# Patient Record
Sex: FEMALE | Race: WHITE | URBAN - METROPOLITAN AREA
[De-identification: names, ages, dates, MRNs, and addresses within clinical notes are randomized per-mention and may not be internally consistent; named-entity substitution may affect disease eponyms.]

---

## 2018-01-02 LAB
HEPATITIS B SURFACE ANTIGEN INTERPRETATION: NEGATIVE
HIV-1 AND HIV-2 ANTIBODIES: NEGATIVE
RPR TITER: NONREACTIVE
RUBELLA ANTIBODY IGG: NORMAL

## 2018-07-06 ENCOUNTER — HOSPITAL ENCOUNTER (OUTPATIENT)
Dept: OTHER | Age: 18
Discharge: HOME | End: 2018-07-07
Attending: OBSTETRICS & GYNECOLOGY | Admitting: OBSTETRICS & GYNECOLOGY

## 2018-08-06 ENCOUNTER — HOSPITAL ENCOUNTER (INPATIENT)
Age: 18
LOS: 3 days | Discharge: HOME | End: 2018-08-09
Attending: OBSTETRICS & GYNECOLOGY | Admitting: OBSTETRICS & GYNECOLOGY
Payer: COMMERCIAL

## 2018-08-06 ENCOUNTER — ANESTHESIA (OUTPATIENT)
Dept: LABOR AND DELIVERY | Age: 18
End: 2018-08-06
Payer: COMMERCIAL

## 2018-08-06 ENCOUNTER — ANESTHESIA EVENT (OUTPATIENT)
Dept: LABOR AND DELIVERY | Age: 18
End: 2018-08-06
Payer: COMMERCIAL

## 2018-08-06 PROBLEM — Z37.9 NORMAL LABOR: Status: ACTIVE | Noted: 2018-08-06

## 2018-08-06 PROBLEM — Z67.91 RH NEGATIVE STATUS DURING PREGNANCY, THIRD TRIMESTER: Status: ACTIVE | Noted: 2018-08-06

## 2018-08-06 PROBLEM — O47.9 IRREGULAR UTERINE CONTRACTIONS: Status: ACTIVE | Noted: 2018-08-06

## 2018-08-06 PROBLEM — O26.893 RH NEGATIVE STATUS DURING PREGNANCY, THIRD TRIMESTER: Status: ACTIVE | Noted: 2018-08-06

## 2018-08-06 LAB
ABO/RH: NORMAL
AMPHETAMINE SCREEN, URINE: NORMAL
ANTIBODY SCREEN: NORMAL
BARBITURATE SCREEN URINE: NORMAL
BENZODIAZEPINE SCREEN, URINE: NORMAL
CANNABINOID SCREEN URINE: NORMAL
COCAINE METABOLITE SCREEN URINE: NORMAL
HCT VFR BLD CALC: 40.8 % (ref 36–48)
HEMOGLOBIN: 13.8 G/DL (ref 12–16)
Lab: NORMAL
MCH RBC QN AUTO: 28.5 PG (ref 26–34)
MCHC RBC AUTO-ENTMCNC: 33.8 G/DL (ref 31–36)
MCV RBC AUTO: 84.3 FL (ref 80–100)
METHADONE SCREEN, URINE: NORMAL
OPIATE SCREEN URINE: NORMAL
OXYCODONE URINE: NORMAL
PDW BLD-RTO: 16.2 % (ref 12.4–15.4)
PH UA: 7
PHENCYCLIDINE SCREEN URINE: NORMAL
PLATELET # BLD: 255 K/UL (ref 135–450)
PMV BLD AUTO: 8.4 FL (ref 5–10.5)
PROPOXYPHENE SCREEN: NORMAL
RBC # BLD: 4.83 M/UL (ref 4–5.2)
WBC # BLD: 13.3 K/UL (ref 4–11)

## 2018-08-06 PROCEDURE — 51701 INSERT BLADDER CATHETER: CPT

## 2018-08-06 PROCEDURE — 2500000003 HC RX 250 WO HCPCS: Performed by: ANESTHESIOLOGY

## 2018-08-06 PROCEDURE — 80307 DRUG TEST PRSMV CHEM ANLYZR: CPT

## 2018-08-06 PROCEDURE — 86780 TREPONEMA PALLIDUM: CPT

## 2018-08-06 PROCEDURE — 85027 COMPLETE CBC AUTOMATED: CPT

## 2018-08-06 PROCEDURE — 2580000003 HC RX 258: Performed by: OBSTETRICS & GYNECOLOGY

## 2018-08-06 PROCEDURE — 86850 RBC ANTIBODY SCREEN: CPT

## 2018-08-06 PROCEDURE — 2580000003 HC RX 258: Performed by: NURSE ANESTHETIST, CERTIFIED REGISTERED

## 2018-08-06 PROCEDURE — 1220000000 HC SEMI PRIVATE OB R&B

## 2018-08-06 PROCEDURE — 3700000025 ANESTHESIA EPIDURAL BLOCK: Performed by: ANESTHESIOLOGY

## 2018-08-06 PROCEDURE — 2500000003 HC RX 250 WO HCPCS: Performed by: NURSE ANESTHETIST, CERTIFIED REGISTERED

## 2018-08-06 PROCEDURE — 86900 BLOOD TYPING SEROLOGIC ABO: CPT

## 2018-08-06 PROCEDURE — 36415 COLL VENOUS BLD VENIPUNCTURE: CPT

## 2018-08-06 PROCEDURE — 86901 BLOOD TYPING SEROLOGIC RH(D): CPT

## 2018-08-06 RX ORDER — BUTORPHANOL TARTRATE 1 MG/ML
1 INJECTION, SOLUTION INTRAMUSCULAR; INTRAVENOUS
Status: DISCONTINUED | OUTPATIENT
Start: 2018-08-06 | End: 2018-08-07

## 2018-08-06 RX ORDER — 0.9 % SODIUM CHLORIDE 0.9 %
VIAL (ML) INJECTION PRN
Status: DISCONTINUED | OUTPATIENT
Start: 2018-08-06 | End: 2018-08-07 | Stop reason: SDUPTHER

## 2018-08-06 RX ORDER — ONDANSETRON 2 MG/ML
4 INJECTION INTRAMUSCULAR; INTRAVENOUS EVERY 6 HOURS PRN
Status: DISCONTINUED | OUTPATIENT
Start: 2018-08-06 | End: 2018-08-07

## 2018-08-06 RX ORDER — SODIUM CHLORIDE, SODIUM LACTATE, POTASSIUM CHLORIDE, AND CALCIUM CHLORIDE .6; .31; .03; .02 G/100ML; G/100ML; G/100ML; G/100ML
500 INJECTION, SOLUTION INTRAVENOUS ONCE
Status: DISCONTINUED | OUTPATIENT
Start: 2018-08-06 | End: 2018-08-07

## 2018-08-06 RX ORDER — DOCUSATE SODIUM 100 MG/1
100 CAPSULE, LIQUID FILLED ORAL 2 TIMES DAILY
Status: DISCONTINUED | OUTPATIENT
Start: 2018-08-06 | End: 2018-08-07

## 2018-08-06 RX ORDER — BUPIVACAINE HYDROCHLORIDE 2.5 MG/ML
INJECTION, SOLUTION EPIDURAL; INFILTRATION; INTRACAUDAL PRN
Status: DISCONTINUED | OUTPATIENT
Start: 2018-08-06 | End: 2018-08-07 | Stop reason: SDUPTHER

## 2018-08-06 RX ORDER — SODIUM CHLORIDE 0.9 % (FLUSH) 0.9 %
10 SYRINGE (ML) INJECTION EVERY 12 HOURS SCHEDULED
Status: DISCONTINUED | OUTPATIENT
Start: 2018-08-06 | End: 2018-08-07

## 2018-08-06 RX ORDER — SODIUM CHLORIDE 0.9 % (FLUSH) 0.9 %
10 SYRINGE (ML) INJECTION PRN
Status: DISCONTINUED | OUTPATIENT
Start: 2018-08-06 | End: 2018-08-07

## 2018-08-06 RX ORDER — TERBUTALINE SULFATE 1 MG/ML
0.25 INJECTION, SOLUTION SUBCUTANEOUS ONCE
Status: DISCONTINUED | OUTPATIENT
Start: 2018-08-06 | End: 2018-08-07

## 2018-08-06 RX ORDER — NALBUPHINE HCL 10 MG/ML
10 AMPUL (ML) INJECTION
Status: DISCONTINUED | OUTPATIENT
Start: 2018-08-06 | End: 2018-08-07

## 2018-08-06 RX ORDER — SODIUM CHLORIDE, SODIUM LACTATE, POTASSIUM CHLORIDE, CALCIUM CHLORIDE 600; 310; 30; 20 MG/100ML; MG/100ML; MG/100ML; MG/100ML
INJECTION, SOLUTION INTRAVENOUS CONTINUOUS
Status: DISCONTINUED | OUTPATIENT
Start: 2018-08-06 | End: 2018-08-07

## 2018-08-06 RX ADMIN — BUPIVACAINE HYDROCHLORIDE 5 ML: 2.5 INJECTION, SOLUTION EPIDURAL; INFILTRATION; INTRACAUDAL; PERINEURAL at 20:23

## 2018-08-06 RX ADMIN — SODIUM CHLORIDE, POTASSIUM CHLORIDE, SODIUM LACTATE AND CALCIUM CHLORIDE: 600; 310; 30; 20 INJECTION, SOLUTION INTRAVENOUS at 21:00

## 2018-08-06 RX ADMIN — Medication 15 ML/HR: at 20:26

## 2018-08-06 RX ADMIN — SODIUM CHLORIDE 5 ML: 9 INJECTION, SOLUTION INTRAMUSCULAR; INTRAVENOUS; SUBCUTANEOUS at 20:24

## 2018-08-06 ASSESSMENT — PAIN - FUNCTIONAL ASSESSMENT: PAIN_FUNCTIONAL_ASSESSMENT: 0-10

## 2018-08-07 LAB — TOTAL SYPHILLIS IGG/IGM: NORMAL

## 2018-08-07 PROCEDURE — 2580000003 HC RX 258: Performed by: OBSTETRICS & GYNECOLOGY

## 2018-08-07 PROCEDURE — 7200000001 HC VAGINAL DELIVERY

## 2018-08-07 PROCEDURE — 6360000002 HC RX W HCPCS: Performed by: OBSTETRICS & GYNECOLOGY

## 2018-08-07 PROCEDURE — 6370000000 HC RX 637 (ALT 250 FOR IP): Performed by: OBSTETRICS & GYNECOLOGY

## 2018-08-07 PROCEDURE — 51701 INSERT BLADDER CATHETER: CPT

## 2018-08-07 PROCEDURE — 1220000000 HC SEMI PRIVATE OB R&B

## 2018-08-07 PROCEDURE — 0KQM0ZZ REPAIR PERINEUM MUSCLE, OPEN APPROACH: ICD-10-PCS | Performed by: OBSTETRICS & GYNECOLOGY

## 2018-08-07 RX ORDER — SODIUM CHLORIDE, SODIUM LACTATE, POTASSIUM CHLORIDE, CALCIUM CHLORIDE 600; 310; 30; 20 MG/100ML; MG/100ML; MG/100ML; MG/100ML
INJECTION, SOLUTION INTRAVENOUS CONTINUOUS
Status: DISCONTINUED | OUTPATIENT
Start: 2018-08-07 | End: 2018-08-09 | Stop reason: HOSPADM

## 2018-08-07 RX ORDER — ONDANSETRON 2 MG/ML
4 INJECTION INTRAMUSCULAR; INTRAVENOUS EVERY 6 HOURS PRN
Status: DISCONTINUED | OUTPATIENT
Start: 2018-08-07 | End: 2018-08-09 | Stop reason: HOSPADM

## 2018-08-07 RX ORDER — SIMETHICONE 80 MG
80 TABLET,CHEWABLE ORAL EVERY 6 HOURS PRN
Status: DISCONTINUED | OUTPATIENT
Start: 2018-08-07 | End: 2018-08-09 | Stop reason: HOSPADM

## 2018-08-07 RX ORDER — FERROUS SULFATE 325(65) MG
325 TABLET ORAL 2 TIMES DAILY WITH MEALS
Status: DISCONTINUED | OUTPATIENT
Start: 2018-08-07 | End: 2018-08-09 | Stop reason: HOSPADM

## 2018-08-07 RX ORDER — ACETAMINOPHEN 325 MG/1
650 TABLET ORAL EVERY 4 HOURS PRN
Status: DISCONTINUED | OUTPATIENT
Start: 2018-08-07 | End: 2018-08-09 | Stop reason: HOSPADM

## 2018-08-07 RX ORDER — DIPHENHYDRAMINE HCL 25 MG
25 TABLET ORAL EVERY 6 HOURS PRN
Status: DISCONTINUED | OUTPATIENT
Start: 2018-08-07 | End: 2018-08-09 | Stop reason: HOSPADM

## 2018-08-07 RX ORDER — IBUPROFEN 800 MG/1
800 TABLET ORAL EVERY 6 HOURS PRN
Status: DISCONTINUED | OUTPATIENT
Start: 2018-08-07 | End: 2018-08-09 | Stop reason: HOSPADM

## 2018-08-07 RX ORDER — SODIUM CHLORIDE 0.9 % (FLUSH) 0.9 %
10 SYRINGE (ML) INJECTION EVERY 12 HOURS SCHEDULED
Status: DISCONTINUED | OUTPATIENT
Start: 2018-08-07 | End: 2018-08-09 | Stop reason: HOSPADM

## 2018-08-07 RX ORDER — HYDROCODONE BITARTRATE AND ACETAMINOPHEN 5; 325 MG/1; MG/1
2 TABLET ORAL EVERY 4 HOURS PRN
Status: DISCONTINUED | OUTPATIENT
Start: 2018-08-07 | End: 2018-08-09 | Stop reason: HOSPADM

## 2018-08-07 RX ORDER — LANOLIN 100 %
OINTMENT (GRAM) TOPICAL PRN
Status: DISCONTINUED | OUTPATIENT
Start: 2018-08-07 | End: 2018-08-09 | Stop reason: HOSPADM

## 2018-08-07 RX ORDER — DOCUSATE SODIUM 100 MG/1
100 CAPSULE, LIQUID FILLED ORAL 2 TIMES DAILY PRN
Status: DISCONTINUED | OUTPATIENT
Start: 2018-08-07 | End: 2018-08-09 | Stop reason: HOSPADM

## 2018-08-07 RX ORDER — HYDROCODONE BITARTRATE AND ACETAMINOPHEN 5; 325 MG/1; MG/1
1 TABLET ORAL EVERY 4 HOURS PRN
Status: DISCONTINUED | OUTPATIENT
Start: 2018-08-07 | End: 2018-08-09 | Stop reason: HOSPADM

## 2018-08-07 RX ORDER — SODIUM CHLORIDE 0.9 % (FLUSH) 0.9 %
10 SYRINGE (ML) INJECTION PRN
Status: DISCONTINUED | OUTPATIENT
Start: 2018-08-07 | End: 2018-08-09 | Stop reason: HOSPADM

## 2018-08-07 RX ADMIN — SODIUM CHLORIDE, POTASSIUM CHLORIDE, SODIUM LACTATE AND CALCIUM CHLORIDE: 600; 310; 30; 20 INJECTION, SOLUTION INTRAVENOUS at 11:00

## 2018-08-07 RX ADMIN — IBUPROFEN 800 MG: 800 TABLET ORAL at 14:49

## 2018-08-07 RX ADMIN — HYDROCODONE BITARTRATE AND ACETAMINOPHEN 1 TABLET: 5; 325 TABLET ORAL at 20:12

## 2018-08-07 RX ADMIN — HYDROCODONE BITARTRATE AND ACETAMINOPHEN 1 TABLET: 5; 325 TABLET ORAL at 15:46

## 2018-08-07 RX ADMIN — Medication 1 MILLI-UNITS/MIN: at 10:00

## 2018-08-07 RX ADMIN — SODIUM CHLORIDE, POTASSIUM CHLORIDE, SODIUM LACTATE AND CALCIUM CHLORIDE: 600; 310; 30; 20 INJECTION, SOLUTION INTRAVENOUS at 01:04

## 2018-08-07 RX ADMIN — SODIUM CHLORIDE, PRESERVATIVE FREE 10 ML: 5 INJECTION INTRAVENOUS at 15:47

## 2018-08-07 RX ADMIN — SODIUM CHLORIDE, POTASSIUM CHLORIDE, SODIUM LACTATE AND CALCIUM CHLORIDE: 600; 310; 30; 20 INJECTION, SOLUTION INTRAVENOUS at 09:04

## 2018-08-07 ASSESSMENT — PAIN SCALES - GENERAL
PAINLEVEL_OUTOF10: 4
PAINLEVEL_OUTOF10: 3
PAINLEVEL_OUTOF10: 4

## 2018-08-07 NOTE — ANESTHESIA PROCEDURE NOTES
Epidural Block    Patient location during procedure: OB  Start time: 8/6/2018 8:16 PM  End time: 8/6/2018 8:35 PM  Reason for block: labor epidural  Staffing  Anesthesiologist: Nicole Gusman  Resident/CRNA: Areli VIRAMONTES  Performed: resident/CRNA   Preanesthetic Checklist  Completed: patient identified, pre-op evaluation, timeout performed, IV checked, risks and benefits discussed, monitors and equipment checked, anesthesia consent given, oxygen available and patient being monitored  Epidural  Patient position: sitting  Prep: ChloraPrep  Patient monitoring: continuous pulse ox and frequent blood pressure checks  Approach: midline  Location: lumbar (1-5)  Injection technique: IMTIAZ air  Provider prep: mask and sterile gloves  Needle  Needle type: Tuohy   Needle gauge: 17 G  Needle length: 3.5 in  Needle insertion depth: 5 cm  Catheter size: 19 G  Catheter at skin depth: 10 cm  Test dose: negative (1.5% Lidocaine with 1:200k Epi)  Assessment  Sensory level: T8  Hemodynamics: stable  Attempts: 1

## 2018-08-07 NOTE — PROGRESS NOTES
Updated Dr. Rossi Grace on SROM and SVE 9/100/+1. No new orders at this time. Will recheck cervix in 1 hour.

## 2018-08-07 NOTE — LACTATION NOTE
This note was copied from a baby's chart. Lactation Progress Note      Data:     Rounding to introduce myself. Mother stated that infant has only really fed after delivery this am. Infant is swaddled and sleeping on mother's chest.    Action: Assisted mother with unwrapping infant and placing STS. Infant fussy at breast and will not latch. Tried to get infant to suck on finger and she would only bite. Took infant to crib to do suck training. Infant has a posterior tongue so she does not put her tongue out all the way when trying to suck so infant does a lot of biting. Worked with infant on finger sucking and she was improving but still biting some. Mother has flat nipples with decrease elasticity so infant is having a hard time latch but bit mother a few times. Infant had an on/ off latch for about 4 minutes before falling asleep. Expressed drops of colostrum in infant's mouth while sleeping. Spoke with mother about the possibility of a nipple shield but want to wait until infant is more awake and active at the breast to be sure she needs it. Encouraged mother to call for f/u assistance. Response: Mother was glad infant fed some and said she would call for feedings.

## 2018-08-07 NOTE — PLAN OF CARE
Problem: Pain:  Goal: Pain level will decrease  Pain level will decrease   Outcome: Ongoing      Problem: Anxiety:  Goal: Level of anxiety will decrease  Level of anxiety will decrease  Outcome: Ongoing      Problem: Breathing Pattern - Ineffective:  Goal: Able to breathe comfortably  Able to breathe comfortably  Outcome: Ongoing      Problem: Fluid Volume - Imbalance:  Goal: Absence of imbalanced fluid volume signs and symptoms  Absence of imbalanced fluid volume signs and symptoms  Outcome: Ongoing      Problem: Infection - Intrapartum Infection:  Goal: Will show no infection signs and symptoms  Will show no infection signs and symptoms  Outcome: Ongoing      Problem: Labor Process - Prolonged:  Goal: Labor progression, first stage, within specified pattern  Labor progression, first stage, within specified pattern  Outcome: Ongoing      Problem:  Screening:  Goal: Ability to make informed decisions regarding treatment has improved  Ability to make informed decisions regarding treatment has improved  Outcome: Ongoing      Problem: Pain - Acute:  Goal: Pain level will decrease  Pain level will decrease   Outcome: Ongoing      Problem: Tissue Perfusion - Uteroplacental, Altered:  Goal: Absence of abnormal fetal heart rate pattern  Absence of abnormal fetal heart rate pattern  Outcome: Ongoing      Problem: Urinary Retention:  Goal: Experiences of bladder distention will decrease  Experiences of bladder distention will decrease  Outcome: Ongoing

## 2018-08-07 NOTE — ANESTHESIA PRE PROCEDURE
Department of Anesthesiology  Preprocedure Note       Name:  Anita Carney   Age:  21 y.o.  :  2000                                          MRN:  [id number]         Date:  2018      Surgeon: * No surgeons listed *    Procedure: * No procedures listed *    Medications prior to admission:   Prior to Admission medications    Not on File       Current medications:    Current Facility-Administered Medications   Medication Dose Route Frequency Provider Last Rate Last Dose    lactated ringers infusion   Intravenous Continuous Aleda Countess, DO        sodium chloride flush 0.9 % injection 10 mL  10 mL Intravenous 2 times per day Aleda Countess, DO        sodium chloride flush 0.9 % injection 10 mL  10 mL Intravenous PRN Aleda Countess, DO        butorphanol (STADOL) injection 1 mg  1 mg Intravenous Q3H PRN Aleda Countess, DO        oxytocin (PITOCIN) 30 units in 500 mL infusion  1 vicente-units/min Intravenous Continuous Aleda Countess, DO        docusate sodium (COLACE) capsule 100 mg  100 mg Oral BID Aleda Countess, DO        [facility]dansetron [facility] injection 4 mg  4 mg Intravenous Q6H PRN Aleda Countess, DO        terbutaline (BRETHINE) injection 0.25 mg  0.25 mg Subcutaneous Once Aleda Countess, DO        lactated ringers bolus  500 mL Intravenous Once Aleda Countess, DO        nalbuphine (NUBAIN) injection 10 mg  10 mg Intravenous Q2H PRN Aleda Countess, DO        sodium chloride 0.9 % 200 mL with fentaNYL 500 mcg, bupivacaine 0.5% 50 mL (OB) epidural                Allergies:  No Known Allergies    Problem List:    Patient Active Problem List   Diagnosis Code    Irregular uterine contractions O62.2    Rh negative status during pregnancy, third trimester O09.893, Z67.91    Normal labor O80, Z37.9       Past Medical History:  History reviewed. No pertinent past medical history. Past Surgical History:  History reviewed.  No pertinent surgical GI/Hepatic/Renal: Neg GI/Hepatic/Renal ROS            Endo/Other: Negative Endo/Other ROS                    Abdominal:           Vascular: negative vascular ROS. Anesthesia Plan      epidural     ASA 2     (Benefits and risks of JENNY were discussed and agreed upon. All questions were answered, and verbal consent was obtained.)        Anesthetic plan and risks discussed with patient and spouse.                       VIOLETA Morrow - CRNA   8/6/2018

## 2018-08-08 LAB
ABO/RH: NORMAL
FETAL SCREEN: NORMAL
HCT VFR BLD CALC: 31.1 % (ref 36–48)
HEMOGLOBIN: 10.4 G/DL (ref 12–16)
MCH RBC QN AUTO: 28.3 PG (ref 26–34)
MCHC RBC AUTO-ENTMCNC: 33.5 G/DL (ref 31–36)
MCV RBC AUTO: 84.6 FL (ref 80–100)
PDW BLD-RTO: 16.6 % (ref 12.4–15.4)
PLATELET # BLD: 217 K/UL (ref 135–450)
PMV BLD AUTO: 8 FL (ref 5–10.5)
RBC # BLD: 3.67 M/UL (ref 4–5.2)
RHIG LOT NUMBER: NORMAL
WBC # BLD: 17.7 K/UL (ref 4–11)

## 2018-08-08 PROCEDURE — 86901 BLOOD TYPING SEROLOGIC RH(D): CPT

## 2018-08-08 PROCEDURE — 1220000000 HC SEMI PRIVATE OB R&B

## 2018-08-08 PROCEDURE — 36415 COLL VENOUS BLD VENIPUNCTURE: CPT

## 2018-08-08 PROCEDURE — 86900 BLOOD TYPING SEROLOGIC ABO: CPT

## 2018-08-08 PROCEDURE — 6370000000 HC RX 637 (ALT 250 FOR IP): Performed by: OBSTETRICS & GYNECOLOGY

## 2018-08-08 PROCEDURE — 85461 HEMOGLOBIN FETAL: CPT

## 2018-08-08 PROCEDURE — 85027 COMPLETE CBC AUTOMATED: CPT

## 2018-08-08 PROCEDURE — 2580000003 HC RX 258: Performed by: OBSTETRICS & GYNECOLOGY

## 2018-08-08 RX ADMIN — SODIUM CHLORIDE, PRESERVATIVE FREE 10 ML: 5 INJECTION INTRAVENOUS at 08:29

## 2018-08-08 RX ADMIN — IBUPROFEN 800 MG: 800 TABLET ORAL at 19:42

## 2018-08-08 RX ADMIN — HYDROCODONE BITARTRATE AND ACETAMINOPHEN 1 TABLET: 5; 325 TABLET ORAL at 00:21

## 2018-08-08 RX ADMIN — IBUPROFEN 800 MG: 800 TABLET ORAL at 08:29

## 2018-08-08 RX ADMIN — HYDROCODONE BITARTRATE AND ACETAMINOPHEN 1 TABLET: 5; 325 TABLET ORAL at 13:37

## 2018-08-08 RX ADMIN — DOCUSATE SODIUM 100 MG: 100 CAPSULE, LIQUID FILLED ORAL at 19:42

## 2018-08-08 RX ADMIN — DOCUSATE SODIUM 100 MG: 100 CAPSULE, LIQUID FILLED ORAL at 09:35

## 2018-08-08 RX ADMIN — IBUPROFEN 800 MG: 800 TABLET ORAL at 00:20

## 2018-08-08 ASSESSMENT — PAIN SCALES - GENERAL
PAINLEVEL_OUTOF10: 5
PAINLEVEL_OUTOF10: 4
PAINLEVEL_OUTOF10: 5
PAINLEVEL_OUTOF10: 3

## 2018-08-08 NOTE — LACTATION NOTE
This note was copied from a baby's chart. Lactation Progress Note      Data:   Patient requesting [address] [facility] assistance with feed. States that baby is not latching well to the left breast. Heart shaped tongue noted with crying. No visible frenulum. Digital suck assessment shows poor tongue mobility. Action: Assisted with good position at breast. Encouraged mom to express many drops of colostrum. Good latch achieved but much clicking noted during 5 min feed. Encouraged much skin to skin and to continue to express colostrum for baby with breast compression. BF education reviewed. Encouraged to call [facility] for f/u prn. Response: Verbalized and demonstrated understanding.

## 2018-08-08 NOTE — PROGRESS NOTES
Report received from [address] [place] [address]. Bedside report given. Introduced myself to pt as her RN for the night. I put my name and phone number on the white board and showed pt how to use her room phone to get a hold of me. Pt was given her plan of care for the night. Call light within reach. Bed in lowest position and wheels are locked. Pt verbalized understanding and denies any further needs at this time. Continue to monitor.

## 2018-08-08 NOTE — LACTATION NOTE
This note was copied from a baby's chart. Lactation Progress Note      Data:     Primip breast feeder requests f/u assistance getting baby to latch with nipple shield. Baby STS with mom, asleep at the breast. Pt reports infant with tongue tie but nothing clippable. Action: Tips given to wake sleepy baby and encourage MINNIE. Gentle stimulation provided to baby while mom hand expressing drops of colostrum and feeding to . Baby with MINNIE with suck bursts but sleepy and loses latch. Infant on/off at the breast with suck bursts. Nipple shield applied to nipple, showing mom tips for good application, and to encourage sustained latch with shield. MINNIE with shield, with few suck bursts but infant quickly falls asleep at the breast. Encouraged much STS, allowing infant to BF often and on demand with first signs of feeding cues. Reviewed appropriate and expected feeding behaviors to expect over the next 24 hours of life and how to know baby is getting enough at the breast. Gave information on tongue tie and possible effects on breastfeeding. Instructed on importance to monitor UOP, ensure infant is gaining weight appropriately once mature milk is in, and latching well without pain or soreness. Instructed when to f/u if struggling with breastfeeding or any areas discussed. Name and number on whiteboard. Infant remains STS with mom. Encouraged to call for f/u prn. Response: Verbalized understanding of teaching provided. Will call for f/u prn.

## 2018-08-08 NOTE — ANESTHESIA POSTPROCEDURE EVALUATION
Department of Anesthesiology  Postprocedure Note    Patient: Laurel Parisi  MRN: [id number]  YOB: 2000  Date of evaluation: 8/8/2018  Time:  8:13 AM     Procedure Summary     Date:  08/06/18 Room / Location:      Anesthesia Start:  2016 Anesthesia Stop:  08/07/18 0946    Procedure:  ANESTHESIA LABOR ANALGESIA Diagnosis:      Scheduled Providers:   Responsible Provider:  Yolanda Wu MD    Anesthesia Type:  epidural ASA Status:  2     Anesthesia Post Evaluation     Patient Vitals for the past 10 hrs:   BP Temp Temp src Pulse Resp   08/08/18 0438 102/67 98.3 °F (36.8 °C) Oral 76 16   08/08/18 0012 110/64 98.1 °F (36.7 °C) Oral 89 16      LABS:  CBC  Lab Results   Component Value Date/Time    WBC 17.7 (H) 08/08/2018 06:41 AM    HGB 10.4 (L) 08/08/2018 06:41 AM    HCT 31.1 (L) 08/08/2018 06:41 AM     08/08/2018 06:41 AM     Nausea & Vomiting:  No    Level of Consciousness:  Awake    Pain Assessment:  Adequate analgesia    Anesthesia Complications:  No apparent anesthetic complications from neuraxial anesthetic/opiod    Jackeline Mojica MD

## 2018-08-08 NOTE — PLAN OF CARE
Problem: VAGINAL DELIVERY - RECOVERY AND POST PARTUM  Goal: Vital signs are medically acceptable  Outcome: Ongoing    Goal: Fundus firm at midline  Outcome: Ongoing    Goal: Moderate rubra without clots, no purulent discharge, no foul smelling lochia  Outcome: Ongoing    Goal: Edema will be absent or minimal  Outcome: Ongoing    Goal: Appropriate behavior observed  Outcome: Ongoing    Goal: Ambulates independently  Outcome: Ongoing

## 2018-08-09 VITALS
HEART RATE: 103 BPM | WEIGHT: 150 LBS | SYSTOLIC BLOOD PRESSURE: 119 MMHG | RESPIRATION RATE: 16 BRPM | DIASTOLIC BLOOD PRESSURE: 76 MMHG | BODY MASS INDEX: 26.58 KG/M2 | TEMPERATURE: 98.6 F | HEIGHT: 63 IN

## 2018-08-09 PROCEDURE — 6370000000 HC RX 637 (ALT 250 FOR IP): Performed by: OBSTETRICS & GYNECOLOGY

## 2018-08-09 RX ORDER — IBUPROFEN 600 MG/1
600 TABLET ORAL EVERY 6 HOURS PRN
Qty: 120 TABLET | Refills: 0 | Status: SHIPPED | OUTPATIENT
Start: 2018-08-09

## 2018-08-09 RX ORDER — HYDROCODONE BITARTRATE AND ACETAMINOPHEN 5; 325 MG/1; MG/1
1 TABLET ORAL EVERY 4 HOURS PRN
Qty: 20 TABLET | Refills: 0 | Status: SHIPPED | OUTPATIENT
Start: 2018-08-09 | End: 2018-08-16

## 2018-08-09 RX ADMIN — DOCUSATE SODIUM 100 MG: 100 CAPSULE, LIQUID FILLED ORAL at 10:34

## 2018-08-09 RX ADMIN — IBUPROFEN 800 MG: 800 TABLET ORAL at 01:58

## 2018-08-09 RX ADMIN — IBUPROFEN 800 MG: 800 TABLET ORAL at 10:34

## 2018-08-09 ASSESSMENT — PAIN SCALES - GENERAL
PAINLEVEL_OUTOF10: 4
PAINLEVEL_OUTOF10: 4

## 2018-08-09 NOTE — LACTATION NOTE
This note was copied from a baby's chart. Lactation Progress Note      Data:     Pt requests f/u assistance. Concerned that her baby isn't feeding well and requesting set up with a breast pump. Offered to assist with latch. Baby STS on mom's chest asleep. Action: Assisted with positioning to the breast. With reposition, infant is fussy. Calming provided while STS with mom. Infant on/off at the breast with suck bursts, then fussy. Diaper changed, void and stool noted. Calming provided and placed back STS with mom. Infant latched onto the breast but appears shallow and clicking noted. Tried several times at the breast with and without shield, infant without sucking on shield, so removed and placed to the breast. MINNIE without shield, but clicking noted throughout 10 minute feeding. Pt reports this has been the baby's best feeding. Set up with hospital grade breast pump and kit and instructed on use with premie+ setting. Encouraged pt to pump q3h x 15 minutes as needed for poor feeds. Encouraged pt that if baby is feeding well, pump is not recommended or needed, discussing the increased risks for overstimulation and engorgement with pumping if baby is feeding well. Also, reassured mom that she may not express much with pumping as colostrum is very thick and the pump has difficulty expressing it. Encouraged to offer any expressed breast milk to baby, showing how to collect, educated on storage, and feeding with syringes. BF and pumping education reviewed. Name and number on whiteboard. Encouraged to call for f/u support prn. Response: Verbalized understanding of teaching and comfortable with plan of care. Will call for f/u prn.

## 2018-08-09 NOTE — PROGRESS NOTES
Discharge Phone Call Log  Patient Name: Abdoulaye Guzman     [facility] Care Provider: Fartun Thompson DO Discharge Date: 2018    Disposition of baby:    Phone Number: [phone] (home)     Attempts to Contact:  Date:    Nurse  Date:    Nurse  Date:    Nurse    Introduce yourself and explain the questionnaire. 1.  Now that you are at home is your pain being well controlled? Y/N   What pain reducing measures are you using? ____________________________________        Information for the patient's :  Andrae Mcgee [[id number]]   Delivery Method: Vaginal, Spontaneous Delivery    2. Are you having any infant feeding issues? Y/N _____________________________      If breastfeeding, were you satisfied with the breastfeeding support services offered? Y/N  3. Any engorgement problems? Y/N  Have you had to supplement? Y/N  4. Have you made or have you already had your first appointment with the baby's doctor? Y/N If no, do you know when to schedule it? Y/N Do you have a safe crib, bassinet or play yard with a firm mattress for your infant to sleep in at home? Y/N  5. Have you scheduled your follow-up appointment? Y/N  If no, do you know when to schedule it? Y/N  6. Did your nurses and physicians include you in the plan of care, communicating with      you respectfully, and in a manner easy to understand? Y/N       Comments:  ___________________________________________________________  7. Is there anyone in particular you would like to mention who provided care for you?          ____________________________    8. Do you have any questions about your discharge instructions or the notebook? Y/N    9. Did your discharge occur in a timely manner? Y/N        Comments: __________________________________________________________    Remember to describe the hospital survey and ask patient to return it when possible.   Questions During Interview:__________________________________________________________  Teaching During interview :___________________________________________________________  ___________________________RN       Date:______________Time:________________

## 2018-08-09 NOTE — FLOWSHEET NOTE
Mom and  Infant discharged via wheelchair to private car. Infant placed in car seat per parents. Mom and baby accompanied by family and in stable condition.

## 2018-08-09 NOTE — LACTATION NOTE
This note was copied from a baby's chart. Lactation Progress Note      Data:   F/U on 1/0 breast feeder who may be d/c home today. Mom states that baby is now feeding well with shield because of posterior frenulum. Output and wt loss are WNL. Action: Observed good but sleepy feed at breast using the shield. Discussed weaning from shield as suck improves. Discharge teaching done; what to expect in the first few days of life, to feed baby at first sign of hunger cue for total of 8-12 times per day after the first DOL, how to properly position and latch baby, how to know baby is getting enough, engorgement prevention and treatment, avoiding bottles and pacifiers, community resources and pumping. Encouraged to call [facility] or outpatient [facility] for f/u prn. Response: Verbalized understanding and comfortable with feeding plan for d/c.

## 2018-08-09 NOTE — DISCHARGE SUMMARY
Obstetric Discharge Summary    Admitting Diagnosis  IUP 39 5/7 weeks  OB History      Para Term  AB Living    1 1 1     1    SAB TAB Ectopic Molar Multiple Live Births            0 1          Reasons for Admission on 2018  1:34 PM  Normal labor [O80, Z37.9]  No comment available  Onset of Labor    Prenatal Procedures  None    Intrapartum Procedures                 Spontaneous Vaginal Delivery: See Labor and Delivery Summary       Postpartum Procedures  None    Postpartum/Operative Complications        Data  Information for the patient's :  Enon Rosemary Girl Conchis Elizabeth [[id number]]   female  Birth Weight: 8 lb 1.6 oz (3.673 kg)    Discharge With Mother  Complications: No    Discharge Diagnosis       Discharge Information  Current Discharge Medication List      START taking these medications    Details   HYDROcodone-acetaminophen (NORCO) 5-325 MG per tablet Take 1 tablet by mouth every 4 hours as needed for Pain for up to 7 days. [address]: 20 tablet, Refills: 0    Associated Diagnoses:  (spontaneous vaginal delivery)      ibuprofen (ADVIL;MOTRIN) 600 MG tablet Take 1 tablet by mouth every 6 hours as needed for Pain (Pain level 1 - 10)  Qty: 120 tablet, Refills: 0             No discharge procedures on file. Conditions - stable    Discharge to: Home  Follow up in 4 weeks at [address] [place] [address]. Discharge Date: 18 Time: 12:00      Comments  S: no complaints, meaghan po, pain controlled by meds, lochia wnl, + ambulation    O: /78   Pulse 80   Temp 98.1 °F (36.7 °C) (Oral)   Resp 18   Ht 5' 3\" (1.6 m)   Wt 150 lb (68 kg)   Breastfeeding? Unknown   BMI 26.57 kg/m²     Abd - soft, ff below umbilicus  Ext - trace edema    WBC/Hgb/Hct/Plts:  17.7/10.4/31.1/217 ( 06)    A/P: PPD # 2   1. Doing well  2.  Anticipate D/C Home today